# Patient Record
Sex: FEMALE | Race: WHITE | NOT HISPANIC OR LATINO
[De-identification: names, ages, dates, MRNs, and addresses within clinical notes are randomized per-mention and may not be internally consistent; named-entity substitution may affect disease eponyms.]

---

## 2017-11-13 PROBLEM — Z00.00 ENCOUNTER FOR PREVENTIVE HEALTH EXAMINATION: Status: ACTIVE | Noted: 2017-11-13

## 2017-11-14 ENCOUNTER — APPOINTMENT (OUTPATIENT)
Dept: SURGICAL ONCOLOGY | Facility: CLINIC | Age: 42
End: 2017-11-14
Payer: COMMERCIAL

## 2017-11-14 VITALS
OXYGEN SATURATION: 99 % | HEART RATE: 61 BPM | HEIGHT: 70 IN | WEIGHT: 145 LBS | BODY MASS INDEX: 20.76 KG/M2 | SYSTOLIC BLOOD PRESSURE: 132 MMHG | DIASTOLIC BLOOD PRESSURE: 86 MMHG

## 2017-11-14 DIAGNOSIS — Z86.39 PERSONAL HISTORY OF OTHER ENDOCRINE, NUTRITIONAL AND METABOLIC DISEASE: ICD-10-CM

## 2017-11-14 PROCEDURE — 99244 OFF/OP CNSLTJ NEW/EST MOD 40: CPT

## 2017-11-20 ENCOUNTER — FORM ENCOUNTER (OUTPATIENT)
Age: 42
End: 2017-11-20

## 2017-11-21 ENCOUNTER — OUTPATIENT (OUTPATIENT)
Dept: OUTPATIENT SERVICES | Facility: HOSPITAL | Age: 42
LOS: 1 days | End: 2017-11-21
Payer: COMMERCIAL

## 2017-11-21 VITALS
RESPIRATION RATE: 16 BRPM | SYSTOLIC BLOOD PRESSURE: 130 MMHG | HEART RATE: 68 BPM | TEMPERATURE: 98 F | HEIGHT: 69 IN | OXYGEN SATURATION: 100 % | DIASTOLIC BLOOD PRESSURE: 80 MMHG | WEIGHT: 147.93 LBS

## 2017-11-21 DIAGNOSIS — C43.61 MALIGNANT MELANOMA OF RIGHT UPPER LIMB, INCLUDING SHOULDER: ICD-10-CM

## 2017-11-21 DIAGNOSIS — E05.90 THYROTOXICOSIS, UNSPECIFIED WITHOUT THYROTOXIC CRISIS OR STORM: ICD-10-CM

## 2017-11-21 DIAGNOSIS — C43.9 MALIGNANT MELANOMA OF SKIN, UNSPECIFIED: ICD-10-CM

## 2017-11-21 DIAGNOSIS — Z01.818 ENCOUNTER FOR OTHER PREPROCEDURAL EXAMINATION: ICD-10-CM

## 2017-11-21 LAB
ANION GAP SERPL CALC-SCNC: 17 MMOL/L — SIGNIFICANT CHANGE UP (ref 5–17)
BUN SERPL-MCNC: 18 MG/DL — SIGNIFICANT CHANGE UP (ref 7–23)
CALCIUM SERPL-MCNC: 10.1 MG/DL — SIGNIFICANT CHANGE UP (ref 8.4–10.5)
CHLORIDE SERPL-SCNC: 102 MMOL/L — SIGNIFICANT CHANGE UP (ref 96–108)
CO2 SERPL-SCNC: 23 MMOL/L — SIGNIFICANT CHANGE UP (ref 22–31)
CREAT SERPL-MCNC: 1.08 MG/DL — SIGNIFICANT CHANGE UP (ref 0.5–1.3)
GLUCOSE SERPL-MCNC: 84 MG/DL — SIGNIFICANT CHANGE UP (ref 70–99)
HCT VFR BLD CALC: 44.4 % — SIGNIFICANT CHANGE UP (ref 34.5–45)
HGB BLD-MCNC: 15.2 G/DL — SIGNIFICANT CHANGE UP (ref 11.5–15.5)
MCHC RBC-ENTMCNC: 30.6 PG — SIGNIFICANT CHANGE UP (ref 27–34)
MCHC RBC-ENTMCNC: 34.2 GM/DL — SIGNIFICANT CHANGE UP (ref 32–36)
MCV RBC AUTO: 89.5 FL — SIGNIFICANT CHANGE UP (ref 80–100)
PLATELET # BLD AUTO: 276 K/UL — SIGNIFICANT CHANGE UP (ref 150–400)
POTASSIUM SERPL-MCNC: 4.3 MMOL/L — SIGNIFICANT CHANGE UP (ref 3.5–5.3)
POTASSIUM SERPL-SCNC: 4.3 MMOL/L — SIGNIFICANT CHANGE UP (ref 3.5–5.3)
RBC # BLD: 4.96 M/UL — SIGNIFICANT CHANGE UP (ref 3.8–5.2)
RBC # FLD: 13.9 % — SIGNIFICANT CHANGE UP (ref 10.3–14.5)
SODIUM SERPL-SCNC: 142 MMOL/L — SIGNIFICANT CHANGE UP (ref 135–145)
WBC # BLD: 4.6 K/UL — SIGNIFICANT CHANGE UP (ref 3.8–10.5)
WBC # FLD AUTO: 4.6 K/UL — SIGNIFICANT CHANGE UP (ref 3.8–10.5)

## 2017-11-21 PROCEDURE — 71020: CPT | Mod: 26

## 2017-11-21 NOTE — H&P PST ADULT - HISTORY OF PRESENT ILLNESS
43 yo female 43 yo female with h/o hyperthyroidism c/o  change in color of right UE nevus x 2 years. Pt had dermatology evaluation- s/p biopsy-malignant melanoma-for wi8de excision melanoma right arm on 11/28/2017.

## 2017-11-21 NOTE — H&P PST ADULT - NSANTHOSAYNRD_GEN_A_CORE
No. JANNETTE screening performed.  STOP BANG Legend: 0-2 = LOW Risk; 3-4 = INTERMEDIATE Risk; 5-8 = HIGH Risk

## 2017-11-21 NOTE — H&P PST ADULT - PMH
Hyperthyroidism  x 2 y  Malignant melanoma  Right upper arm Hyperthyroidism  x 2 y  Ileus of unspecified type  2014- Resolved  Malignant melanoma  Right upper arm

## 2017-11-22 ENCOUNTER — RESULT REVIEW (OUTPATIENT)
Age: 42
End: 2017-11-22

## 2017-11-22 PROCEDURE — 85027 COMPLETE CBC AUTOMATED: CPT

## 2017-11-22 PROCEDURE — 88321 CONSLTJ&REPRT SLD PREP ELSWR: CPT

## 2017-11-22 PROCEDURE — 80048 BASIC METABOLIC PNL TOTAL CA: CPT

## 2017-11-22 PROCEDURE — G0463: CPT

## 2017-11-22 PROCEDURE — 71046 X-RAY EXAM CHEST 2 VIEWS: CPT

## 2017-11-22 RX ORDER — LIDOCAINE HCL 20 MG/ML
0.2 VIAL (ML) INJECTION ONCE
Qty: 0 | Refills: 0 | Status: DISCONTINUED | OUTPATIENT
Start: 2017-11-28 | End: 2017-12-13

## 2017-11-22 RX ORDER — ACETAMINOPHEN 500 MG
975 TABLET ORAL ONCE
Qty: 0 | Refills: 0 | Status: COMPLETED | OUTPATIENT
Start: 2017-11-28 | End: 2017-11-28

## 2017-11-22 RX ORDER — SODIUM CHLORIDE 9 MG/ML
3 INJECTION INTRAMUSCULAR; INTRAVENOUS; SUBCUTANEOUS EVERY 8 HOURS
Qty: 0 | Refills: 0 | Status: DISCONTINUED | OUTPATIENT
Start: 2017-11-28 | End: 2017-12-13

## 2017-11-28 ENCOUNTER — OUTPATIENT (OUTPATIENT)
Dept: OUTPATIENT SERVICES | Facility: HOSPITAL | Age: 42
LOS: 1 days | End: 2017-11-28
Payer: COMMERCIAL

## 2017-11-28 ENCOUNTER — APPOINTMENT (OUTPATIENT)
Dept: SURGICAL ONCOLOGY | Facility: HOSPITAL | Age: 42
End: 2017-11-28
Payer: COMMERCIAL

## 2017-11-28 ENCOUNTER — RESULT REVIEW (OUTPATIENT)
Age: 42
End: 2017-11-28

## 2017-11-28 VITALS
SYSTOLIC BLOOD PRESSURE: 133 MMHG | OXYGEN SATURATION: 100 % | HEART RATE: 56 BPM | TEMPERATURE: 98 F | DIASTOLIC BLOOD PRESSURE: 65 MMHG | RESPIRATION RATE: 16 BRPM

## 2017-11-28 VITALS
OXYGEN SATURATION: 100 % | DIASTOLIC BLOOD PRESSURE: 75 MMHG | HEIGHT: 69 IN | RESPIRATION RATE: 18 BRPM | WEIGHT: 147.93 LBS | TEMPERATURE: 98 F | HEART RATE: 57 BPM | SYSTOLIC BLOOD PRESSURE: 127 MMHG

## 2017-11-28 DIAGNOSIS — C43.61 MALIGNANT MELANOMA OF RIGHT UPPER LIMB, INCLUDING SHOULDER: ICD-10-CM

## 2017-11-28 PROCEDURE — 14000 TIS TRNFR TRUNK 10 SQ CM/<: CPT

## 2017-11-28 PROCEDURE — 14021 TIS TRNFR S/A/L 10.1-30 SQCM: CPT

## 2017-11-28 PROCEDURE — 88305 TISSUE EXAM BY PATHOLOGIST: CPT | Mod: 26

## 2017-11-28 PROCEDURE — 88305 TISSUE EXAM BY PATHOLOGIST: CPT

## 2017-11-28 RX ORDER — FAMOTIDINE 10 MG/ML
1 INJECTION INTRAVENOUS
Qty: 0 | Refills: 0 | COMMUNITY

## 2017-11-28 RX ORDER — CELECOXIB 200 MG/1
200 CAPSULE ORAL ONCE
Qty: 0 | Refills: 0 | Status: COMPLETED | OUTPATIENT
Start: 2017-11-28 | End: 2017-11-28

## 2017-11-28 RX ORDER — PROPYLTHIOURACIL 50 MG
0 TABLET ORAL
Qty: 0 | Refills: 0 | COMMUNITY

## 2017-11-28 RX ADMIN — Medication 975 MILLIGRAM(S): at 10:17

## 2017-11-28 RX ADMIN — CELECOXIB 200 MILLIGRAM(S): 200 CAPSULE ORAL at 10:17

## 2017-11-28 NOTE — ASU PATIENT PROFILE, ADULT - PMH
Hyperthyroidism  x 2 y  Ileus of unspecified type  2014- Resolved  Malignant melanoma  Right upper arm

## 2017-11-28 NOTE — ASU DISCHARGE PLAN (ADULT/PEDIATRIC). - SPECIAL INSTRUCTIONS
Activity: Resume activities of daily living. Do not lift heavy weights (greater than 15 pounds) or engage in strenuous exercise until you see your doctor in the office in 1-2 weeks. You may shower tomorrow, just let the water run over the wound, no scrubbing. No immersion baths or swimming in pools or ocean until you see us in the office again. Please leave the steri-strips (small white bandaids) on. These will remain on and fall off by themselves in the next few weeks.  Diet: Resume your regular diet  Follow up: Please make an appointment with Dr. Pilo Fagan in 1-2 weeks. Please find contact information on this page. Activity: Resume activities of daily living. Do not lift heavy weights (greater than 15 pounds) or engage in strenuous exercise until you see your doctor in the office in 1 week. You may shower tomorrow, just let the water run over the wound, no scrubbing. No immersion baths or swimming in pools or ocean until you see us in the office again. Please leave the steri-strips (small white bandaids) on. These will remain on and fall off by themselves in the next few weeks.  Diet: Resume your regular diet  Follow up: Please make an appointment with Dr. Pilo Fagan in 1 week. Please find contact information on this page.

## 2017-11-28 NOTE — ASU DISCHARGE PLAN (ADULT/PEDIATRIC). - MEDICATION SUMMARY - MEDICATIONS TO TAKE
I will START or STAY ON the medications listed below when I get home from the hospital:    oxyCODONE-acetaminophen 5 mg-325 mg oral tablet  -- 1 tab(s) by mouth every 6 hours, As Needed -for severe pain MDD:4   -- Caution federal law prohibits the transfer of this drug to any person other  than the person for whom it was prescribed.  May cause drowsiness.  Alcohol may intensify this effect.  Use care when operating dangerous machinery.  This prescription cannot be refilled.  This product contains acetaminophen.  Do not use  with any other product containing acetaminophen to prevent possible liver damage.  Using more of this medication than prescribed may cause serious breathing problems.    -- Indication: For severe postop pain    propylthiouracil 50 mg oral tablet  -- orally 2 times a day  -- Indication: For hyperthyroidism    famotidine 20 mg oral tablet  -- 1  by mouth x 2 doses pre op as directed  -- Indication: For gastric reflux

## 2017-11-28 NOTE — ASU DISCHARGE PLAN (ADULT/PEDIATRIC). - INSTRUCTIONS
Please make an appointment to see Dr. Fagan this Thursday 11/30/17. Please make an appointment to see Dr. Fagan in 1 week.

## 2017-11-28 NOTE — ASU DISCHARGE PLAN (ADULT/PEDIATRIC). - NOTIFY
Numbness, tingling/Pain not relieved by Medications/Fever greater than 101/Bleeding that does not stop

## 2017-12-05 LAB — SURGICAL PATHOLOGY STUDY: SIGNIFICANT CHANGE UP

## 2017-12-14 ENCOUNTER — APPOINTMENT (OUTPATIENT)
Dept: SURGICAL ONCOLOGY | Facility: CLINIC | Age: 42
End: 2017-12-14
Payer: COMMERCIAL

## 2017-12-14 ENCOUNTER — TRANSCRIPTION ENCOUNTER (OUTPATIENT)
Age: 42
End: 2017-12-14

## 2017-12-14 VITALS
OXYGEN SATURATION: 100 % | BODY MASS INDEX: 20.76 KG/M2 | SYSTOLIC BLOOD PRESSURE: 133 MMHG | DIASTOLIC BLOOD PRESSURE: 67 MMHG | WEIGHT: 145 LBS | HEART RATE: 66 BPM | HEIGHT: 70 IN

## 2017-12-14 DIAGNOSIS — C43.61 MALIGNANT MELANOMA OF RIGHT UPPER LIMB, INCLUDING SHOULDER: ICD-10-CM

## 2017-12-14 PROCEDURE — 99024 POSTOP FOLLOW-UP VISIT: CPT

## 2017-12-14 RX ORDER — PROPYLTHIOURACIL 50 MG/1
50 TABLET ORAL
Refills: 0 | Status: ACTIVE | COMMUNITY

## 2018-03-06 ENCOUNTER — APPOINTMENT (OUTPATIENT)
Dept: SURGICAL ONCOLOGY | Facility: CLINIC | Age: 43
End: 2018-03-06
Payer: COMMERCIAL

## 2018-03-06 VITALS
HEART RATE: 59 BPM | DIASTOLIC BLOOD PRESSURE: 75 MMHG | WEIGHT: 145 LBS | HEIGHT: 70 IN | OXYGEN SATURATION: 100 % | BODY MASS INDEX: 20.76 KG/M2 | SYSTOLIC BLOOD PRESSURE: 120 MMHG

## 2018-03-06 DIAGNOSIS — Z85.820 ENCOUNTER FOR FOLLOW-UP EXAMINATION AFTER COMPLETED TREATMENT FOR MALIGNANT NEOPLASM: ICD-10-CM

## 2018-03-06 DIAGNOSIS — Z08 ENCOUNTER FOR FOLLOW-UP EXAMINATION AFTER COMPLETED TREATMENT FOR MALIGNANT NEOPLASM: ICD-10-CM

## 2018-03-06 PROCEDURE — 99214 OFFICE O/P EST MOD 30 MIN: CPT

## 2018-08-21 ENCOUNTER — APPOINTMENT (OUTPATIENT)
Dept: SURGICAL ONCOLOGY | Facility: CLINIC | Age: 43
End: 2018-08-21

## 2020-10-20 NOTE — ASU PATIENT PROFILE, ADULT - NSCAFFEINETYPE_GEN_ALL_CORE_SD
PRINCIPAL DISCHARGE DIAGNOSIS  Diagnosis: Postoperative state  Assessment and Plan of Treatment:        coffee PRINCIPAL DISCHARGE DIAGNOSIS  Diagnosis: Postoperative state  Assessment and Plan of Treatment: Regular diet as tolerated, regular activity as tolerated, no heavy lifting for first six weeks.  Nothing per vagina: no intercourse, tampons or douching for six weeks.  Call your provider if you experience fevers, chills, worsening abdominal pain, inability to urinate or worsening vaginal bleeding. You have paper tapes called steri strips covering your skin incisions; do not remove these. These will fall off on their own or can be removed by your doctor at your postoperative visit. Please follow up with your provider in 2 weeks.

## 2024-05-10 NOTE — ASU PREOP CHECKLIST - SPO2 (%)
[Negative] : Respiratory [FreeTextEntry5] : see HPI [FreeTextEntry7] : see HPI [FreeTextEntry9] : see HPI [de-identified] : seeI 100
